# Patient Record
Sex: FEMALE | Race: WHITE | NOT HISPANIC OR LATINO
[De-identification: names, ages, dates, MRNs, and addresses within clinical notes are randomized per-mention and may not be internally consistent; named-entity substitution may affect disease eponyms.]

---

## 2022-10-16 ENCOUNTER — NON-APPOINTMENT (OUTPATIENT)
Age: 62
End: 2022-10-16

## 2022-10-20 ENCOUNTER — APPOINTMENT (OUTPATIENT)
Dept: PULMONOLOGY | Facility: CLINIC | Age: 62
End: 2022-10-20

## 2022-10-20 DIAGNOSIS — Z00.00 ENCOUNTER FOR GENERAL ADULT MEDICAL EXAMINATION W/OUT ABNORMAL FINDINGS: ICD-10-CM

## 2022-10-20 PROCEDURE — 94010 BREATHING CAPACITY TEST: CPT

## 2022-10-20 PROCEDURE — 94726 PLETHYSMOGRAPHY LUNG VOLUMES: CPT

## 2022-10-20 PROCEDURE — 94729 DIFFUSING CAPACITY: CPT

## 2022-11-02 ENCOUNTER — NON-APPOINTMENT (OUTPATIENT)
Age: 62
End: 2022-11-02

## 2022-11-03 ENCOUNTER — APPOINTMENT (OUTPATIENT)
Dept: PULMONOLOGY | Facility: CLINIC | Age: 62
End: 2022-11-03

## 2022-11-03 ENCOUNTER — NON-APPOINTMENT (OUTPATIENT)
Age: 62
End: 2022-11-03

## 2022-11-03 VITALS
HEART RATE: 72 BPM | DIASTOLIC BLOOD PRESSURE: 82 MMHG | SYSTOLIC BLOOD PRESSURE: 162 MMHG | RESPIRATION RATE: 14 BRPM | BODY MASS INDEX: 20.95 KG/M2 | OXYGEN SATURATION: 99 % | TEMPERATURE: 97.5 F | WEIGHT: 118.25 LBS | HEIGHT: 63 IN

## 2022-11-03 LAB
ALBUMIN SERPL ELPH-MCNC: 4.5 G/DL
ALP BLD-CCNC: 55 U/L
ALT SERPL-CCNC: 10 U/L
ANION GAP SERPL CALC-SCNC: 11 MMOL/L
AST SERPL-CCNC: 19 U/L
BASOPHILS # BLD AUTO: 0 K/UL
BASOPHILS NFR BLD AUTO: 0 %
BILIRUB SERPL-MCNC: 0.4 MG/DL
BUN SERPL-MCNC: 18 MG/DL
CALCIUM SERPL-MCNC: 9.4 MG/DL
CHLORIDE SERPL-SCNC: 101 MMOL/L
CO2 SERPL-SCNC: 25 MMOL/L
CREAT SERPL-MCNC: 0.64 MG/DL
EGFR: 99 ML/MIN/1.73M2
EOSINOPHIL # BLD AUTO: 0.08 K/UL
EOSINOPHIL NFR BLD AUTO: 1.2 %
GLUCOSE SERPL-MCNC: 97 MG/DL
HCT VFR BLD CALC: 41.1 %
HGB BLD-MCNC: 13.7 G/DL
IMM GRANULOCYTES NFR BLD AUTO: 0.3 %
LYMPHOCYTES # BLD AUTO: 0.99 K/UL
LYMPHOCYTES NFR BLD AUTO: 14.6 %
MAN DIFF?: NORMAL
MCHC RBC-ENTMCNC: 33.2 PG
MCHC RBC-ENTMCNC: 33.3 GM/DL
MCV RBC AUTO: 99.5 FL
MONOCYTES # BLD AUTO: 0.69 K/UL
MONOCYTES NFR BLD AUTO: 10.1 %
NEUTROPHILS # BLD AUTO: 5.02 K/UL
NEUTROPHILS NFR BLD AUTO: 73.8 %
NT-PROBNP SERPL-MCNC: 28 PG/ML
PLATELET # BLD AUTO: 227 K/UL
POTASSIUM SERPL-SCNC: 4.4 MMOL/L
PROT SERPL-MCNC: 7.6 G/DL
RBC # BLD: 4.13 M/UL
RBC # FLD: 13.2 %
SODIUM SERPL-SCNC: 137 MMOL/L
WBC # FLD AUTO: 6.8 K/UL

## 2022-11-03 PROCEDURE — 94618 PULMONARY STRESS TESTING: CPT

## 2022-11-03 PROCEDURE — ZZZZZ: CPT

## 2022-11-03 PROCEDURE — 99205 OFFICE O/P NEW HI 60 MIN: CPT | Mod: 25

## 2022-11-03 PROCEDURE — 36415 COLL VENOUS BLD VENIPUNCTURE: CPT

## 2022-11-03 NOTE — DISCUSSION/SUMMARY
[FreeTextEntry1] : ---Assessment plan----------The patient has been referred here for further opinion regarding pulmonary problem,\par \par 63 yo F Never smoker w/ PMH CST on xarelto (dx ~2019), APLS, osteoporosis on prolia, lupus, and scleroderma [   SCL 70 positive   ]   treated with plaquenil for over 15 years. 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 twice daily\par 2 APL-----on anticoagulation-Xarelto\par 3 needs 6-minute walk test, repeat CT chest\par 8-es-dq-date on all vaccinations\par 5 osteoporosis----on Prolia\par 6 labs today\par \par Follow-up in 6 weeks\par \par \par \par \par Thanks for allowing  me to participate  in the care of this patient.  Patient at this time  will follow  the above mentioned recommendations and return back for follow up visit. If you have any questions  I can be reached  at # [phone] (office).\par \par Jose Shaikh MD, FCCP \par Director, Pulmonary Hypertension Program \par [facility] \par Division of Pulmonary, Critical Care and Sleep Medicine \par  Professor of Medicine \par [employer]\par \par OLAF Gary-C\par

## 2022-11-03 NOTE — PHYSICAL EXAM
[No Acute Distress] : no acute distress [Normal Oropharynx] : normal oropharynx [III] : Mallampati Class: III [Normal S1, S2] : normal s1, s2 [No Abnormalities] : no abnormalities [Benign] : benign [Normal Gait] : normal gait [Non-Pitting] : non-pitting [No Focal Deficits] : no focal deficits [Oriented x3] : oriented x3 [TextBox_68] : Bilateral basal crackles [TextBox_105] : Sclerodactyly

## 2022-11-03 NOTE — REVIEW OF SYSTEMS
[Postnasal Drip] : postnasal drip [Cough] : cough [Dyspnea] : dyspnea [SOB on Exertion] : sob on exertion [GERD] : gerd [Arthralgias] : arthralgias [Raynaud] : raynaud [Fever] : no fever [Chills] : no chills [Nasal Congestion] : no nasal congestion [Chest Discomfort] : no chest discomfort [Rash] : no rash [Headache] : no headache [Numbness] : no numbness [Diabetes] : no diabetes [Obesity] : no obesity

## 2022-11-03 NOTE — HISTORY OF PRESENT ILLNESS
[TextBox_4] : This letter  is regarding your patient  who  attended pulmonary out patient office today.  I have reviewed  patient's  past history, social history, family history and medication list. I also  reviewed nurse practitioners/ and fellows  notes and assessment and agree with it.  \par The patient was referred by  [rheumatology]-----\par \par \par 63 yo F Never smoker w/ PMH CST on xarelto (dx ~2019), APLS, osteoporosis on prolia, lupus, and scleroderma treated with plaquenil for over 15 years. Reports being asymptomatic for over a decade. Presents today after referral from rheumatology. Reports in January 2021 began experiencing slow progressive systemic symptoms with swelling of digits, legs, and exertional shortness of breath. Underwent cardiac evaluation including echo and exercise stress test and reported normal. Follow up CT coronary March 28, 2022 with evidence of ground glass opacities. Treated with bronchodilators and 4 week taper of steroids with improvement in symptoms. Had joint flare in August treated supportively. Had endoscopy recently and reportedly normal\par \par You have a CAT scan report on this patient------No history of , fever, chills , rigors, chest pain, or hemoptysis. Questionable history of Raynaud's phenomenon. No h/o significant weight loss in last few months. No history of liver dysfunction , collagen vascular disorder or chronic thromboembolic disease. I would classify the patient's dyspnea as WHO  FUNCTIONAL CLASS I--------\par \par \par \par ----Echo  date------- details not available but was told it is normal--------\par ----Pft date----10/2022 normal lung volumes, decreased dlco-----\par ----Ct scan date------- outside film-----bibasilar groundglass appearance\par \par \par

## 2022-11-04 LAB
CRP SERPL-MCNC: <3 MG/L
ERYTHROCYTE [SEDIMENTATION RATE] IN BLOOD BY WESTERGREN METHOD: 14 MM/HR
FERRITIN SERPL-MCNC: 25 NG/ML

## 2022-11-14 ENCOUNTER — NON-APPOINTMENT (OUTPATIENT)
Age: 62
End: 2022-11-14

## 2022-11-14 ENCOUNTER — APPOINTMENT (OUTPATIENT)
Dept: CARDIOLOGY | Facility: CLINIC | Age: 62
End: 2022-11-14

## 2022-11-14 VITALS
SYSTOLIC BLOOD PRESSURE: 147 MMHG | OXYGEN SATURATION: 99 % | DIASTOLIC BLOOD PRESSURE: 82 MMHG | BODY MASS INDEX: 21.09 KG/M2 | HEART RATE: 72 BPM | WEIGHT: 119 LBS | HEIGHT: 63 IN

## 2022-11-14 DIAGNOSIS — Z85.828 PERSONAL HISTORY OF OTHER MALIGNANT NEOPLASM OF SKIN: ICD-10-CM

## 2022-11-14 DIAGNOSIS — R06.02 SHORTNESS OF BREATH: ICD-10-CM

## 2022-11-14 DIAGNOSIS — Z82.49 FAMILY HISTORY OF ISCHEMIC HEART DISEASE AND OTHER DISEASES OF THE CIRCULATORY SYSTEM: ICD-10-CM

## 2022-11-14 DIAGNOSIS — R60.9 EDEMA, UNSPECIFIED: ICD-10-CM

## 2022-11-14 DIAGNOSIS — Z87.81 PERSONAL HISTORY OF (HEALED) TRAUMATIC FRACTURE: ICD-10-CM

## 2022-11-14 DIAGNOSIS — Z87.898 PERSONAL HISTORY OF OTHER SPECIFIED CONDITIONS: ICD-10-CM

## 2022-11-14 DIAGNOSIS — M25.50 PAIN IN UNSPECIFIED JOINT: ICD-10-CM

## 2022-11-14 PROCEDURE — 99204 OFFICE O/P NEW MOD 45 MIN: CPT | Mod: 25

## 2022-11-14 PROCEDURE — 93000 ELECTROCARDIOGRAM COMPLETE: CPT

## 2022-11-14 RX ORDER — MULTIVITAMIN
TABLET ORAL
Refills: 0 | Status: ACTIVE | COMMUNITY

## 2022-11-14 RX ORDER — PSYLLIUM HUSK 0.4 G
CAPSULE ORAL
Refills: 0 | Status: ACTIVE | COMMUNITY

## 2022-11-14 RX ORDER — HYDROXYCHLOROQUINE SULFATE 200 MG/1
200 TABLET ORAL DAILY
Refills: 0 | Status: ACTIVE | COMMUNITY

## 2022-11-14 RX ORDER — OMEPRAZOLE 20 MG/1
20 CAPSULE, DELAYED RELEASE ORAL
Qty: 90 | Refills: 0 | Status: ACTIVE | COMMUNITY
Start: 2022-09-09

## 2022-11-14 RX ORDER — AMLODIPINE BESYLATE 5 MG/1
5 TABLET ORAL
Qty: 60 | Refills: 0 | Status: ACTIVE | COMMUNITY
Start: 2022-09-09

## 2022-11-14 RX ORDER — UBIDECARENONE/VIT E ACET 100MG-5
CAPSULE ORAL
Refills: 0 | Status: ACTIVE | COMMUNITY

## 2022-11-14 RX ORDER — OMEPRAZOLE MAGNESIUM 20 MG/1
20 CAPSULE, DELAYED RELEASE ORAL
Refills: 0 | Status: ACTIVE | COMMUNITY

## 2022-11-14 RX ORDER — RIVAROXABAN 20 MG/1
20 TABLET, FILM COATED ORAL
Qty: 1 | Refills: 6 | Status: ACTIVE | COMMUNITY

## 2022-11-14 RX ORDER — RIVAROXABAN 20 MG/1
20 TABLET, FILM COATED ORAL
Qty: 90 | Refills: 0 | Status: ACTIVE | COMMUNITY
Start: 2022-05-16

## 2022-11-14 RX ORDER — AMLODIPINE BESYLATE 2.5 MG/1
2.5 TABLET ORAL
Refills: 0 | Status: DISCONTINUED | COMMUNITY
End: 2022-11-14

## 2022-11-14 RX ORDER — HYDROXYCHLOROQUINE SULFATE 200 MG/1
200 TABLET, FILM COATED ORAL
Qty: 90 | Refills: 0 | Status: ACTIVE | COMMUNITY
Start: 2022-09-09

## 2022-11-14 NOTE — HISTORY OF PRESENT ILLNESS
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 \par \par She carries a past medical history of hypertension, hyperlipidemia, interstitial lung disease,  antiphospholipid syndrome, CVST ( on Xarelto), lupus, scleroderma with Plaquenil for over 15 years.\par \par Patient is followed by pulmonologist, Dr. Lizett Shaikh for her ILD.\par Most recently patient was exercising on her [place] and developed an irregular heart rate. She continued to work out and the irregular rhythm resolved.\par \par She reports that she had a cardiac workup at [facility] and was told that everything was "ok".\par Patient now wants to establish care with [facility] cardiology. \par \par Blood pressure today:  147 /82\par EKG- Sinus rhythm @ 72 bpm\par \par Patient is maintained on blood pressure medication:  Amlodipine increased from 2.5 mg to 5 mg. \par \par Denies any issues with chest discomfort.

## 2022-11-14 NOTE — DISCUSSION/SUMMARY
[EKG obtained to assist in diagnosis and management of assessed problem(s)] : EKG obtained to assist in diagnosis and management of assessed problem(s) [FreeTextEntry1] : Ms. Martinez is a 62 year old female with  medical history of hypertension, hyperlipidemia, interstitial lung disease,  antiphospholipid syndrome, CVST ( on Xarelto), lupus, scleroderma with Plaquenil for over 15 years.\par \par Most recently patient was exercising on her [place] and developed an irregular heart rate. She continued to work out and the irregular rhythm resolved.\par \par #Symptomatic Palpitations\par   Recommending exercise stress test to evaluate for inducible exercise arrhythmia\par \par #Hypertension\par   Blood pressure demonstrates improved control, but suboptimal\par   Continue on Amlodipine 5mg QD \par   Requested a one week BP og to evaluate BP pressures at home\par \par #APLS\par   Treated with Xarelto 20 mg QD\par \par #Lupus/Scleroderma\par   Treated by Rheumatologist, Dr. Avni Jewell\par   Continuing on Plaquenil 200 mg QD\par   Continuing Mycophenolate Mofetil 500 mg BID\par \par - Encouraged patient to continue healthy exercise and eating habits, focusing on a Mediterranean style of eating and aiming for the recommended 150 minutes per week of moderate physical activity.\par \par - Encouraged the patient to find healthy outlets and coping mechanisms to help manage stress, such as physical activity/exercise, reducing workload if possible, spending time with family and friends, engaging in an enjoyable hobby, or using meditation or mindfulness techniques.\par \par \par \par

## 2022-11-23 LAB
EJ AB SER QL: NEGATIVE
ENA JO1 AB SER IA-ACNC: <20 UNITS
ENA PM/SCL AB SER-ACNC: <20 UNITS
ENA SM+RNP AB SER IA-ACNC: <20 UNITS
ENA SS-A IGG SER QL: <20 UNITS
FIBRILLARIN AB SER QL: NEGATIVE
KU AB SER QL: ABNORMAL
MDA-5 (P140)(CADM-140): <20 UNITS
MI2 AB SER QL: ABNORMAL
NXP-2 (P140): <20 UNITS
OJ AB SER QL: NEGATIVE
PL12 AB SER QL: NEGATIVE
PL7 AB SER QL: NEGATIVE
SRP AB SERPL QL: NEGATIVE
TIF GAMMA (P155/140): <20 UNITS
U2 SNRNP AB SER QL: NEGATIVE

## 2022-12-14 ENCOUNTER — NON-APPOINTMENT (OUTPATIENT)
Age: 62
End: 2022-12-14

## 2022-12-15 ENCOUNTER — APPOINTMENT (OUTPATIENT)
Dept: PULMONOLOGY | Facility: CLINIC | Age: 62
End: 2022-12-15

## 2022-12-15 VITALS
SYSTOLIC BLOOD PRESSURE: 142 MMHG | RESPIRATION RATE: 15 BRPM | TEMPERATURE: 98 F | WEIGHT: 118.13 LBS | BODY MASS INDEX: 20.93 KG/M2 | HEART RATE: 96 BPM | HEIGHT: 63 IN | OXYGEN SATURATION: 98 % | DIASTOLIC BLOOD PRESSURE: 86 MMHG

## 2022-12-15 DIAGNOSIS — D68.61 ANTIPHOSPHOLIPID SYNDROME: ICD-10-CM

## 2022-12-15 PROCEDURE — 36415 COLL VENOUS BLD VENIPUNCTURE: CPT

## 2022-12-15 PROCEDURE — 99215 OFFICE O/P EST HI 40 MIN: CPT | Mod: 25

## 2022-12-15 RX ORDER — AZELASTINE HYDROCHLORIDE 137 UG/1
137 SPRAY, METERED NASAL TWICE DAILY
Qty: 1 | Refills: 1 | Status: ACTIVE | COMMUNITY
Start: 2022-12-15 | End: 1900-01-01

## 2022-12-15 RX ORDER — FLUTICASONE PROPIONATE 50 UG/1
50 SPRAY, METERED NASAL DAILY
Qty: 1 | Refills: 1 | Status: ACTIVE | COMMUNITY
Start: 2022-12-15 | End: 1900-01-01

## 2022-12-15 NOTE — HISTORY OF PRESENT ILLNESS
[TextBox_4] : This letter  is regarding your patient  who  attended pulmonary out patient office today.  I have reviewed  patient's  past history, social history, family history and medication list. I also  reviewed nurse practitioners/ and fellows  notes and assessment and agree with it.  \par The patient was referred by  [rheumatology]-----\par \par \par 61 yo F Never smoker w/ PMH CST on xarelto (dx ~2019), APLS, osteoporosis on prolia, lupus, and scleroderma treated with plaquenil for over 15 years. Reports being asymptomatic for over a decade. Presents today after referral from rheumatology. Reports in January 2021 began experiencing slow progressive systemic symptoms with swelling of digits, legs, and exertional shortness of breath. Underwent cardiac evaluation including echo and exercise stress test and reported normal. Follow up CT coronary March 28, 2022 with evidence of ground glass opacities. Treated with bronchodilators and 4 week taper of steroids with improvement in symptoms. Had joint flare in August treated supportively. Had endoscopy recently and reportedly normal\par \par You have a CAT scan report on this patient------No history of , fever, chills , rigors, chest pain, or hemoptysis. Questionable history of Raynaud's phenomenon. No h/o significant weight loss in last few months. No history of liver dysfunction , collagen vascular disorder or chronic thromboembolic disease. I would classify the patient's dyspnea as WHO  FUNCTIONAL CLASS I--------\par \par \par \par ----Echo  date------- details not available but was told it is normal--------\par ----Pft date----10/2022 normal lung volumes, decreased dlco-----\par ----Ct scan date------- outside film-----bibasilar groundglass appearance\par \par \par 12/2022 ILD/SLE/scleroderma/  ??DERMATOMYOSITIS [ POSITIVE  M-12 ANTIBODIES ] - on cellcept 500mg bid and plaquenil per Dr Jewell rheum\par No pulm complaints- cycles 3x weekly- did have some palpitations w/exercise- saw Dr Gunter in cardiology who ordered a stress test (sched for jan 2023)\par is c/o post nasal drip\par utd w/influenza vac and covid vac x 2

## 2022-12-15 NOTE — DISCUSSION/SUMMARY
[FreeTextEntry1] : ---Assessment plan----------The patient has been referred here for further opinion regarding pulmonary problem,\par \par 61 yo F Never smoker w/ PMH CST on xarelto (dx ~2019), APLS, osteoporosis on prolia, lupus, and scleroderma [   SCL 70 positive   ]   treated with plaquenil for over 15 years. ----Has developed interstitial lung disease.\par \par 1  ILD --SLE/scleroderma/  ??DERMATOMYOSITIS [ POSITIVE  M-12 ANTIBODIES ]  -- increase CellCept 750 mg p.o. twice daily and plaquenil- follows Dr Jewell- rheum---WILL ALSO OBTAIN CARDIOPULMONARY STRESS TEST - - - - \par 2 APL-----on anticoagulation-Xarelto\par 3 palpitations- f/u with Dr Gunter o/s stress test\par 1-tx-vg-date on all vaccinations\par 5 osteoporosis----on Prolia\par 7- flonase and azelastine for post nasal  drip\par 8- labs drawn in our office today/ s/p resp swab\par 9-f/u feb 2023\par \par \par Thanks for allowing  me to participate  in the care of this patient.  Patient at this time  will follow  the above mentioned recommendations and return back for follow up visit. If you have any questions  I can be reached  at # [phone] (office).\par \par Jose Shaikh MD, FCCP \par Director, Pulmonary Hypertension Program \par [facility] \par Division of Pulmonary, Critical Care and Sleep Medicine \par  Professor of Medicine \par [employer] [facility] [employer]\par \par OLAF Gary-C\par

## 2022-12-16 LAB
25(OH)D3 SERPL-MCNC: 65.4 NG/ML
ALBUMIN SERPL ELPH-MCNC: 4.7 G/DL
ALP BLD-CCNC: 52 U/L
ALT SERPL-CCNC: 9 U/L
ANION GAP SERPL CALC-SCNC: 12 MMOL/L
AST SERPL-CCNC: 18 U/L
BASOPHILS # BLD AUTO: 0.01 K/UL
BASOPHILS NFR BLD AUTO: 0.1 %
BILIRUB SERPL-MCNC: 0.4 MG/DL
BUN SERPL-MCNC: 15 MG/DL
CALCIUM SERPL-MCNC: 10.3 MG/DL
CHLORIDE SERPL-SCNC: 101 MMOL/L
CHOLEST SERPL-MCNC: 234 MG/DL
CO2 SERPL-SCNC: 26 MMOL/L
CREAT SERPL-MCNC: 0.65 MG/DL
DEPRECATED KAPPA LC FREE/LAMBDA SER: 0.92 RATIO
EGFR: 98 ML/MIN/1.73M2
EOSINOPHIL # BLD AUTO: 0.07 K/UL
EOSINOPHIL NFR BLD AUTO: 1 %
ESTIMATED AVERAGE GLUCOSE: 108 MG/DL
FERRITIN SERPL-MCNC: 37 NG/ML
GLUCOSE SERPL-MCNC: 102 MG/DL
HBA1C MFR BLD HPLC: 5.4 %
HCT VFR BLD CALC: 43.8 %
HDLC SERPL-MCNC: 87 MG/DL
HGB BLD-MCNC: 14.8 G/DL
IGA SER QL IEP: 160 MG/DL
IGG SER QL IEP: 1824 MG/DL
IGM SER QL IEP: 247 MG/DL
IMM GRANULOCYTES NFR BLD AUTO: 0.3 %
KAPPA LC CSF-MCNC: 2.49 MG/DL
KAPPA LC SERPL-MCNC: 2.3 MG/DL
LDLC SERPL CALC-MCNC: 134 MG/DL
LYMPHOCYTES # BLD AUTO: 1.15 K/UL
LYMPHOCYTES NFR BLD AUTO: 15.8 %
MAN DIFF?: NORMAL
MCHC RBC-ENTMCNC: 33.8 GM/DL
MCHC RBC-ENTMCNC: 34.3 PG
MCV RBC AUTO: 101.6 FL
MONOCYTES # BLD AUTO: 0.7 K/UL
MONOCYTES NFR BLD AUTO: 9.6 %
NEUTROPHILS # BLD AUTO: 5.34 K/UL
NEUTROPHILS NFR BLD AUTO: 73.2 %
NONHDLC SERPL-MCNC: 147 MG/DL
PLATELET # BLD AUTO: 281 K/UL
POTASSIUM SERPL-SCNC: 4.8 MMOL/L
PROT SERPL-MCNC: 8 G/DL
RAPID RVP RESULT: NOT DETECTED
RBC # BLD: 4.31 M/UL
RBC # FLD: 13.3 %
SARS-COV-2 RNA PNL RESP NAA+PROBE: NOT DETECTED
SODIUM SERPL-SCNC: 140 MMOL/L
TOTAL IGE SMQN RAST: 21 KU/L
TRIGL SERPL-MCNC: 66 MG/DL
TSH SERPL-ACNC: 2.45 UIU/ML
VIT B12 SERPL-MCNC: 421 PG/ML
WBC # FLD AUTO: 7.29 K/UL

## 2022-12-16 RX ORDER — AZITHROMYCIN 250 MG/1
250 TABLET, FILM COATED ORAL
Qty: 1 | Refills: 0 | Status: ACTIVE | COMMUNITY
Start: 2022-12-16 | End: 1900-01-01

## 2022-12-18 ENCOUNTER — NON-APPOINTMENT (OUTPATIENT)
Age: 62
End: 2022-12-18

## 2022-12-19 ENCOUNTER — APPOINTMENT (OUTPATIENT)
Dept: PULMONOLOGY | Facility: CLINIC | Age: 62
End: 2022-12-19
Payer: COMMERCIAL

## 2022-12-19 ENCOUNTER — APPOINTMENT (OUTPATIENT)
Dept: PULMONOLOGY | Facility: CLINIC | Age: 62
End: 2022-12-19

## 2022-12-19 DIAGNOSIS — R09.82 POSTNASAL DRIP: ICD-10-CM

## 2022-12-19 DIAGNOSIS — U07.1 COVID-19: ICD-10-CM

## 2022-12-19 LAB
M TB IFN-G BLD-IMP: NEGATIVE
QUANTIFERON TB PLUS MITOGEN MINUS NIL: 0.68 IU/ML
QUANTIFERON TB PLUS NIL: 0.01 IU/ML
QUANTIFERON TB PLUS TB1 MINUS NIL: 0 IU/ML
QUANTIFERON TB PLUS TB2 MINUS NIL: 0.01 IU/ML

## 2022-12-19 PROCEDURE — 99214 OFFICE O/P EST MOD 30 MIN: CPT | Mod: 95

## 2022-12-19 NOTE — DISCUSSION/SUMMARY
[FreeTextEntry1] : ---Assessment plan----------The patient has been referred here for further opinion regarding pulmonary problem,\par \par 61 yo F Never smoker w/ PMH CST on xarelto (dx ~2019), APLS, osteoporosis on prolia, lupus, and scleroderma [   SCL 70 positive   ]   treated with plaquenil for over 15 years. ----Has developed interstitial lung disease.\par \par \par COVID POSITIVE \par 1  ILD --SLE/scleroderma/  ??DERMATOMYOSITIS [ POSITIVE  M-12 ANTIBODIES ]  cellcept to stay at 500mg bid this week until recovered from covid ---then 750mg bid\par 2) already on zpack and nasal sprays -  flonase and azelastine\par 3)  APL-----on anticoagulation-Xarelto\par 4) maintain hydration\par 5) f/u this week if needed\par \par \par \par Thanks for allowing  me to participate  in the care of this patient.  Patient at this time  will follow  the above mentioned recommendations and return back for follow up visit. If you have any questions  I can be reached  at # [phone] (office).\par \par Jose Shaikh MD, FCCP \par Director, Pulmonary Hypertension Program \par [facility] \par Division of Pulmonary, Critical Care and Sleep Medicine \par  Professor of Medicine \par [employer]\par \par RAYA Gary\par

## 2022-12-19 NOTE — HISTORY OF PRESENT ILLNESS
[Home] : at home, [unfilled] , at the time of the visit. [Medical Office: ([facility])___] : at the medical office located in  [Verbal consent obtained from patient] : the patient, [unfilled] [TextBox_4] : This letter  is regarding your patient  who  attended pulmonary out patient office today.  I have reviewed  patient's  past history, social history, family history and medication list. I also  reviewed nurse practitioners/ and fellows  notes and assessment and agree with it.  \par The patient was referred by  [rheumatology]-----\par \par \par 63 yo F Never smoker w/ PMH CST on xarelto (dx ~2019), APLS, osteoporosis on prolia, lupus, and scleroderma treated with plaquenil for over 15 years. Reports being asymptomatic for over a decade. Presents today after referral from rheumatology. Reports in January 2021 began experiencing slow progressive systemic symptoms with swelling of digits, legs, and exertional shortness of breath. Underwent cardiac evaluation including echo and exercise stress test and reported normal. Follow up CT coronary March 28, 2022 with evidence of ground glass opacities. Treated with bronchodilators and 4 week taper of steroids with improvement in symptoms. Had joint flare in August treated supportively. Had endoscopy recently and reportedly normal\par \par You have a CAT scan report on this patient------No history of , fever, chills , rigors, chest pain, or hemoptysis. Questionable history of Raynaud's phenomenon. No h/o significant weight loss in last few months. No history of liver dysfunction , collagen vascular disorder or chronic thromboembolic disease. I would classify the patient's dyspnea as WHO  FUNCTIONAL CLASS I--------\par \par \par \par \par ----Echo  date------- details not available but was told it is normal--------\par ----Pft date----10/2022 normal lung volumes, decreased dlco-----\par ----Ct scan date------- outside film-----bibasilar groundglass appearance\par \par \par 12/2022 ILD/SLE/scleroderma/  ??DERMATOMYOSITIS [ POSITIVE  M-12 ANTIBODIES ] - on cellcept 500mg bid and plaquenil per Dr Jewell rheum\par No pulm complaints- cycles 3x weekly- did have some palpitations w/exercise- saw Dr Gunter in cardiology who ordered a stress test (sched for jan 2023)\par is c/o post nasal drip\par utd w/influenza vac and covid vac x 2\par \par \par 12/19/22 teledoc visit- no apparent distress\par tested positive for covid yesterday\par minimal symptoms- afebrile- already on antibiotic (zpack) and nasal spray

## 2022-12-19 NOTE — REVIEW OF SYSTEMS
[Fever] : no fever [Chills] : no chills [Nasal Congestion] : no nasal congestion [Postnasal Drip] : postnasal drip [Cough] : cough [Dyspnea] : dyspnea [SOB on Exertion] : sob on exertion [Chest Discomfort] : no chest discomfort [GERD] : gerd [Arthralgias] : arthralgias [Raynaud] : raynaud [Rash] : no rash [Headache] : no headache [Numbness] : no numbness [Diabetes] : no diabetes [Obesity] : no obesity

## 2022-12-20 ENCOUNTER — NON-APPOINTMENT (OUTPATIENT)
Age: 62
End: 2022-12-20

## 2022-12-23 ENCOUNTER — NON-APPOINTMENT (OUTPATIENT)
Age: 62
End: 2022-12-23

## 2023-01-03 ENCOUNTER — OUTPATIENT (OUTPATIENT)
Dept: OUTPATIENT SERVICES | Facility: HOSPITAL | Age: 63
LOS: 1 days | End: 2023-01-03
Payer: MEDICARE

## 2023-01-03 ENCOUNTER — APPOINTMENT (OUTPATIENT)
Dept: CV DIAGNOSTICS | Facility: HOSPITAL | Age: 63
End: 2023-01-03

## 2023-01-03 DIAGNOSIS — Z87.898 PERSONAL HISTORY OF OTHER SPECIFIED CONDITIONS: ICD-10-CM

## 2023-01-03 PROCEDURE — 93018 CV STRESS TEST I&R ONLY: CPT

## 2023-01-03 PROCEDURE — 93017 CV STRESS TEST TRACING ONLY: CPT

## 2023-01-03 PROCEDURE — 93016 CV STRESS TEST SUPVJ ONLY: CPT

## 2023-01-31 LAB
ALBUMIN SERPL ELPH-MCNC: 4.4 G/DL
ALP BLD-CCNC: 43 U/L
ALT SERPL-CCNC: 10 U/L
ANION GAP SERPL CALC-SCNC: 11 MMOL/L
AST SERPL-CCNC: 18 U/L
BASOPHILS # BLD AUTO: 0 K/UL
BASOPHILS NFR BLD AUTO: 0 %
BILIRUB SERPL-MCNC: 0.6 MG/DL
BUN SERPL-MCNC: 16 MG/DL
CALCIUM SERPL-MCNC: 9.7 MG/DL
CHLORIDE SERPL-SCNC: 102 MMOL/L
CO2 SERPL-SCNC: 27 MMOL/L
CREAT SERPL-MCNC: 0.73 MG/DL
EGFR: 91 ML/MIN/1.73M2
EOSINOPHIL # BLD AUTO: 0.06 K/UL
EOSINOPHIL NFR BLD AUTO: 1 %
GLUCOSE SERPL-MCNC: 105 MG/DL
HCT VFR BLD CALC: 41 %
HGB BLD-MCNC: 13.7 G/DL
IMM GRANULOCYTES NFR BLD AUTO: 0.2 %
LYMPHOCYTES # BLD AUTO: 1.05 K/UL
LYMPHOCYTES NFR BLD AUTO: 17.8 %
MAN DIFF?: NORMAL
MCHC RBC-ENTMCNC: 33.3 PG
MCHC RBC-ENTMCNC: 33.4 GM/DL
MCV RBC AUTO: 99.5 FL
MONOCYTES # BLD AUTO: 0.66 K/UL
MONOCYTES NFR BLD AUTO: 11.2 %
NEUTROPHILS # BLD AUTO: 4.12 K/UL
NEUTROPHILS NFR BLD AUTO: 69.8 %
PLATELET # BLD AUTO: 248 K/UL
POTASSIUM SERPL-SCNC: 4.8 MMOL/L
PROT SERPL-MCNC: 6.9 G/DL
RBC # BLD: 4.12 M/UL
RBC # FLD: 13.3 %
SODIUM SERPL-SCNC: 140 MMOL/L
WBC # FLD AUTO: 5.9 K/UL

## 2023-02-02 ENCOUNTER — NON-APPOINTMENT (OUTPATIENT)
Age: 63
End: 2023-02-02

## 2023-02-03 ENCOUNTER — NON-APPOINTMENT (OUTPATIENT)
Age: 63
End: 2023-02-03

## 2023-03-27 ENCOUNTER — APPOINTMENT (OUTPATIENT)
Dept: PULMONOLOGY | Facility: CLINIC | Age: 63
End: 2023-03-27
Payer: MEDICARE

## 2023-03-27 ENCOUNTER — RESULT CHARGE (OUTPATIENT)
Age: 63
End: 2023-03-27

## 2023-03-27 DIAGNOSIS — Z20.822 CONTACT WITH AND (SUSPECTED) EXPOSURE TO COVID-19: ICD-10-CM

## 2023-03-27 LAB — SARS-COV-2 AG RESP QL IA.RAPID: NEGATIVE

## 2023-03-27 PROCEDURE — 94621 CARDIOPULM EXERCISE TESTING: CPT

## 2023-03-27 PROCEDURE — 99215 OFFICE O/P EST HI 40 MIN: CPT | Mod: 25

## 2023-03-27 PROCEDURE — 87811 SARS-COV-2 COVID19 W/OPTIC: CPT

## 2023-03-27 PROCEDURE — ZZZZZ: CPT

## 2023-03-27 PROCEDURE — 94375 RESPIRATORY FLOW VOLUME LOOP: CPT

## 2023-03-27 NOTE — DISCUSSION/SUMMARY
[FreeTextEntry1] : ---Assessment plan----------The patient has been referred here for further opinion regarding pulmonary problem,\par \par 64 yo F Never smoker w/ PMH CST on xarelto (dx ~2019), APLS, osteoporosis on prolia, lupus, and scleroderma [   SCL 70 positive   ]   treated with plaquenil for over 15 years. --- developed interstitial lung disease  POST COVID 2022----\par \par \par COVID POSITIVE \par 1  ILD --SLE/scleroderma/  ??DERMATOMYOSITIS [ POSITIVE  M-12 ANTIBODIES ]  cellcept to stay at 500mg bid this week until recovered from covid ---then 750mg bid ---ASLO ON PLAQUENI L - - - -\par 2) OSTOPOROSIS--ON PROLIA---\par 3)  APL-----on anticoagulation-Xarelto\par 4) NEEDS ECHO  \par 5 CPET  RESULTS\par \par \par \par Thanks for allowing  me to participate  in the care of this patient.  Patient at this time  will follow  the above mentioned recommendations and return back for follow up visit. If you have any questions  I can be reached  at # [phone] (office).\par \par Jose Shaikh MD, FCCP \par Director, Pulmonary Hypertension Program \par [facility] \par Division of Pulmonary, Critical Care and Sleep Medicine \par  Professor of Medicine \par [employer]\par \par NICK GaryC\par

## 2023-03-27 NOTE — HISTORY OF PRESENT ILLNESS
[Never] : never [TextBox_4] : This letter  is regarding your patient  who  attended pulmonary out patient office today.  I have reviewed  patient's  past history, social history, family history and medication list. I also  reviewed nurse practitioners/ and fellows  notes and assessment and agree with it.  \par The patient was referred by  [rheumatology]-----\par \par \par 62 yo F Never smoker w/ PMH CST on xarelto (dx ~2019), APLS, osteoporosis on prolia, lupus, and scleroderma treated with plaquenil for over 15 years. Reports being asymptomatic for over a decade. Presents today after referral from rheumatology. Reports in January 2021 began experiencing slow progressive systemic symptoms with swelling of digits, legs, and exertional shortness of breath. Underwent cardiac evaluation including echo and exercise stress test and reported normal. Follow up CT coronary March 28, 2022 with evidence of ground glass opacities. Treated with bronchodilators and 4 week taper of steroids with improvement in symptoms. Had joint flare in August treated supportively. Had endoscopy recently and reportedly normal\par \par You have a CAT scan report on this patient------No history of , fever, chills , rigors, chest pain, or hemoptysis. Questionable history of Raynaud's phenomenon. No h/o significant weight loss in last few months. No history of liver dysfunction , collagen vascular disorder or chronic thromboembolic disease. I would classify the patient's dyspnea as WHO  FUNCTIONAL CLASS I--------\par \par \par \par \par ----Echo  date------- details not available but was told it is normal--------\par ----Pft date----10/2022 normal lung volumes, decreased dlco-----\par ----Ct scan date------- outside film-----bibasilar groundglass appearance\par \par \par 12/2022 ILD/SLE/scleroderma/  ??DERMATOMYOSITIS [ POSITIVE  M-12 ANTIBODIES ] - on cellcept 500mg bid and plaquenil per Dr Jewell rheum\par No pulm complaints- cycles 3x weekly- did have some palpitations w/exercise- saw Dr Gunter in cardiology who ordered a stress test (sched for jan 2023)\par is c/o post nasal drip\par utd w/influenza vac and covid vac x 2\par \par \par 12/19/22 teledoc visit- no apparent distress\par tested positive for covid yesterday\par minimal symptoms- afebrile- already on antibiotic (zpack) and nasal spray\par \par march 2023------------  ILD/SLE/scleroderma/  ??DERMATOMYOSITIS [ POSITIVE  M-12 ANTIBODIES ] - on cellcept 500mg bid and plaquenil per Dr Jewell rheum ----NEEDS    REPEAT   CT CHEST - - ----CPET OIN MARCH 2023-----CARDIOLOGY DR SETH  -----\par is c/o post nasal drip\par utd w/influenza vac and covid vac x 2\par

## 2023-04-01 ENCOUNTER — APPOINTMENT (OUTPATIENT)
Dept: CT IMAGING | Facility: CLINIC | Age: 63
End: 2023-04-01

## 2023-04-01 ENCOUNTER — APPOINTMENT (OUTPATIENT)
Dept: CT IMAGING | Facility: CLINIC | Age: 63
End: 2023-04-01
Payer: MEDICARE

## 2023-04-01 PROCEDURE — 71250 CT THORAX DX C-: CPT

## 2023-07-24 ENCOUNTER — APPOINTMENT (OUTPATIENT)
Dept: PULMONOLOGY | Facility: CLINIC | Age: 63
End: 2023-07-24
Payer: MEDICARE

## 2023-07-24 VITALS
DIASTOLIC BLOOD PRESSURE: 72 MMHG | BODY MASS INDEX: 20.49 KG/M2 | HEART RATE: 69 BPM | HEIGHT: 64 IN | RESPIRATION RATE: 16 BRPM | WEIGHT: 120 LBS | OXYGEN SATURATION: 98 % | TEMPERATURE: 96.5 F | SYSTOLIC BLOOD PRESSURE: 131 MMHG

## 2023-07-24 DIAGNOSIS — M32.9 SYSTEMIC LUPUS ERYTHEMATOSUS, UNSPECIFIED: ICD-10-CM

## 2023-07-24 DIAGNOSIS — J84.9 INTERSTITIAL PULMONARY DISEASE, UNSPECIFIED: ICD-10-CM

## 2023-07-24 DIAGNOSIS — M34.9 SYSTEMIC SCLEROSIS, UNSPECIFIED: ICD-10-CM

## 2023-07-24 LAB
25(OH)D3 SERPL-MCNC: 73.8 NG/ML
ALBUMIN SERPL ELPH-MCNC: 4.7 G/DL
ALP BLD-CCNC: 39 U/L
ALT SERPL-CCNC: 11 U/L
ANION GAP SERPL CALC-SCNC: 15 MMOL/L
AST SERPL-CCNC: 20 U/L
BILIRUB SERPL-MCNC: 0.4 MG/DL
BUN SERPL-MCNC: 16 MG/DL
CALCIUM SERPL-MCNC: 9.4 MG/DL
CHLORIDE SERPL-SCNC: 99 MMOL/L
CHOLEST SERPL-MCNC: 218 MG/DL
CO2 SERPL-SCNC: 24 MMOL/L
CREAT SERPL-MCNC: 0.64 MG/DL
EGFR: 98 ML/MIN/1.73M2
ESTIMATED AVERAGE GLUCOSE: 103 MG/DL
FERRITIN SERPL-MCNC: 45 NG/ML
GLUCOSE SERPL-MCNC: 94 MG/DL
HBA1C MFR BLD HPLC: 5.2 %
HDLC SERPL-MCNC: 92 MG/DL
LDLC SERPL CALC-MCNC: 117 MG/DL
NONHDLC SERPL-MCNC: 126 MG/DL
NT-PROBNP SERPL-MCNC: <36 PG/ML
POTASSIUM SERPL-SCNC: 4.4 MMOL/L
PROT SERPL-MCNC: 7.3 G/DL
SODIUM SERPL-SCNC: 137 MMOL/L
TRIGL SERPL-MCNC: 48 MG/DL
TSH SERPL-ACNC: 1.67 UIU/ML
VIT B12 SERPL-MCNC: 420 PG/ML

## 2023-07-24 PROCEDURE — 36415 COLL VENOUS BLD VENIPUNCTURE: CPT

## 2023-07-24 PROCEDURE — 99215 OFFICE O/P EST HI 40 MIN: CPT | Mod: 25

## 2023-07-24 NOTE — HISTORY OF PRESENT ILLNESS
[Never] : never [TextBox_4] : This letter  is regarding your patient  who  attended pulmonary out patient office today.  I have reviewed  patient's  past history, social history, family history and medication list. I also  reviewed nurse practitioners/ and fellows  notes and assessment and agree with it.  \par The patient was referred by  [rheumatology]-----\par \par \par 62 yo F Never smoker w/ PMH CST on xarelto (dx ~2019), APLS, osteoporosis on prolia, lupus, and scleroderma treated with plaquenil for over 15 years. Reports being asymptomatic for over a decade. Presents today after referral from rheumatology. Reports in January 2021 began experiencing slow progressive systemic symptoms with swelling of digits, legs, and exertional shortness of breath. Underwent cardiac evaluation including echo and exercise stress test and reported normal. Follow up CT coronary March 28, 2022 with evidence of ground glass opacities. Treated with bronchodilators and 4 week taper of steroids with improvement in symptoms. Had joint flare in August treated supportively. Had endoscopy recently and reportedly normal\par \par You have a CAT scan report on this patient------No history of , fever, chills , rigors, chest pain, or hemoptysis. Questionable history of Raynaud's phenomenon. No h/o significant weight loss in last few months. No history of liver dysfunction , collagen vascular disorder or chronic thromboembolic disease. I would classify the patient's dyspnea as WHO  FUNCTIONAL CLASS I--------\par \par \par \par \par ----Echo  date-------2023------- no evidence of pulmonary hypertension\par ----Pft date----10/2022 normal lung volumes, decreased dlco-----\par \par cpet 2023---normal vo2 max ----31 ml/kg/min \par ----Ct scan date------- outside film-----bibasilar groundglass appearance\par \par --CT 4/2023\par IMPRESSION:\par Basilar and peribronchovascular predominant groundglass opacities with traction bronchiectasis, most consistent with an NSIP pattern of interstitial lung disease, particularly in the setting of reported connective tissue disease. Overall appearance is unchanged since May 2022.\par \par \par 12/2022 ILD/SLE/scleroderma/  ??DERMATOMYOSITIS [ POSITIVE  M-12 ANTIBODIES ] - on cellcept 500mg bid and plaquenil per Dr Jewell rheum\par No pulm complaints- cycles 3x weekly- did have some palpitations w/exercise- saw Dr Gunter in cardiology who ordered a stress test (sched for jan 2023)\par is c/o post nasal drip\par utd w/influenza vac and covid vac x 2\par \par \par 12/19/22 teledoc visit- no apparent distress\par tested positive for covid yesterday\par minimal symptoms- afebrile- already on antibiotic (zpack) and nasal spray\par \par march 2023------------  ILD/SLE/scleroderma/  ??DERMATOMYOSITIS [ POSITIVE  M-12 ANTIBODIES ] - on cellcept 500mg bid and plaquenil per Dr Jewell rheum ----NEEDS    REPEAT   CT CHEST - - ----CPET OIN MARCH 2023-----CARDIOLOGY DR SETH  -----\par is c/o post nasal drip\par utd w/influenza vac and covid vac x 2\par \par \par 7/2023 ILD/SLE- stable [NSIP ]------ on cellcept 1g bid and plaquenil 200mg\par exercises at least 3x weekly- peloton, swimming, walking\par no present pulm complaints\par CPET was normal VO2 max----- CT chest is stable--- changes suggestive of NSIP

## 2023-07-24 NOTE — DISCUSSION/SUMMARY
[FreeTextEntry1] : ---Assessment plan----------The patient has been referred here for further opinion regarding pulmonary problem,\par \par 64 yo F Never smoker w/ PMH CST on xarelto (dx ~2019), APLS, osteoporosis on prolia, lupus, and scleroderma [   SCL 70 positive   ]   treated with plaquenil for over 15 years. --- developed interstitial lung disease  POST COVID 2022----\par \par \par COVID POSITIVE \par 1  ILD --SLE/scleroderma/  ??DERMATOMYOSITIS [ POSITIVE  M-12 ANTIBODIES ]  cellcept 1g bid ---ASLO ON PLAQUENI L 200mg daily- follow up w/rheum - - - -\par \par pt has NSIP   pattern on  ct chest--stable-----on CPET  HER VO2 MA X---within normal limit-------- will continue with the present treatment\par 2) OSTOPOROSIS--ON PROLIA---\par 3)  APL-----on anticoagulation-Xarelto\par 4) NEEDS ECHO  annually\par 5) labs drawn in our office today, (repeat M2 antibody next year) \par 6) f/u in 6m\par \par \par Thanks for allowing  me to participate  in the care of this patient.  Patient at this time  will follow  the above mentioned recommendations and return back for follow up visit. If you have any questions  I can be reached  at # [phone] (office).\par \par [facility]ikh MD, [facility] \par

## 2023-07-24 NOTE — END OF VISIT
[Time Spent: ___ minutes] : I have spent [unfilled] minutes of time on the encounter. [FreeTextEntry3] : \par  I, Dr. Jose Shaikh  personally performed the evaluation and management (E/M) services for this established patient who presents today with (a) new problem(s)/exacerbation of (an) existing condition(s). That E/M includes conducting the clinically appropriate interval history &/or exam, assessing all new/exacerbated conditions, and establishing a new plan of care. Today, my JERMAIN, Zulema Sheets NP, , was here to observe my evaluation and management service for this new problem/exacerbated condition and follow the plan of care established by me going forward.\par

## 2023-10-03 ENCOUNTER — APPOINTMENT (OUTPATIENT)
Dept: PULMONOLOGY | Facility: CLINIC | Age: 63
End: 2023-10-03
Payer: MEDICARE

## 2023-10-03 DIAGNOSIS — R05.3 CHRONIC COUGH: ICD-10-CM

## 2023-10-03 PROCEDURE — 99443: CPT | Mod: 95

## 2023-10-03 RX ORDER — PREDNISONE 10 MG/1
10 TABLET ORAL DAILY
Qty: 14 | Refills: 1 | Status: ACTIVE | COMMUNITY
Start: 2023-10-03 | End: 1900-01-01

## 2023-10-03 RX ORDER — BENZONATATE 100 MG/1
100 CAPSULE ORAL 3 TIMES DAILY
Qty: 60 | Refills: 1 | Status: ACTIVE | COMMUNITY
Start: 2023-10-03 | End: 1900-01-01

## 2023-10-04 LAB
RAPID RVP RESULT: DETECTED
RSV RNA SPEC QL NAA+PROBE: DETECTED
SARS-COV-2 RNA PNL RESP NAA+PROBE: NOT DETECTED

## 2023-10-05 ENCOUNTER — NON-APPOINTMENT (OUTPATIENT)
Age: 63
End: 2023-10-05

## 2023-10-06 RX ORDER — MYCOPHENOLATE MOFETIL 500 MG/1
500 TABLET ORAL
Qty: 360 | Refills: 3 | Status: ACTIVE | COMMUNITY
Start: 2023-10-06 | End: 1900-01-01

## 2023-12-08 ENCOUNTER — RESULT REVIEW (OUTPATIENT)
Age: 63
End: 2023-12-08

## 2024-01-11 ENCOUNTER — OUTPATIENT (OUTPATIENT)
Dept: OUTPATIENT SERVICES | Facility: HOSPITAL | Age: 64
LOS: 1 days | End: 2024-01-11
Payer: MEDICARE

## 2024-01-11 ENCOUNTER — APPOINTMENT (OUTPATIENT)
Dept: CV DIAGNOSITCS | Facility: HOSPITAL | Age: 64
End: 2024-01-11

## 2024-01-11 DIAGNOSIS — M34.9 SYSTEMIC SCLEROSIS, UNSPECIFIED: ICD-10-CM

## 2024-01-11 PROCEDURE — 93306 TTE W/DOPPLER COMPLETE: CPT

## 2024-01-11 PROCEDURE — 93306 TTE W/DOPPLER COMPLETE: CPT | Mod: 26

## 2024-01-22 ENCOUNTER — APPOINTMENT (OUTPATIENT)
Dept: PULMONOLOGY | Facility: CLINIC | Age: 64
End: 2024-01-22
Payer: MEDICARE

## 2024-01-22 VITALS
HEART RATE: 65 BPM | DIASTOLIC BLOOD PRESSURE: 82 MMHG | TEMPERATURE: 97.9 F | BODY MASS INDEX: 20.66 KG/M2 | RESPIRATION RATE: 15 BRPM | SYSTOLIC BLOOD PRESSURE: 131 MMHG | HEIGHT: 64 IN | WEIGHT: 121 LBS | OXYGEN SATURATION: 96 %

## 2024-01-22 PROCEDURE — 94010 BREATHING CAPACITY TEST: CPT

## 2024-01-22 PROCEDURE — 99215 OFFICE O/P EST HI 40 MIN: CPT | Mod: 25

## 2024-01-22 PROCEDURE — ZZZZZ: CPT

## 2024-01-22 RX ORDER — AZITHROMYCIN 250 MG/1
250 TABLET, FILM COATED ORAL
Qty: 1 | Refills: 0 | Status: DISCONTINUED | COMMUNITY
Start: 2023-10-03 | End: 2024-01-22

## 2024-01-22 NOTE — DISCUSSION/SUMMARY
[FreeTextEntry1] : ---Assessment plan----------The patient has been referred here for further opinion regarding pulmonary problem,  63 yo F Never smoker w/ PMH CST on xarelto (dx ~2019), APLS, osteoporosis on prolia, lupus, and scleroderma [   SCL 70 positive   ]   treated with plaquenil for over 15 years. --- developed interstitial lung disease  POST COVID 2022----    1  ILD --SLE/scleroderma/  ??DERMATOMYOSITIS [ POSITIVE  M-12 ANTIBODIES ]  cellcept 1g bid ---ASLO ON PLAQUENI L 200mg daily- follow up w/rheum - - - -  pt has NSIP   pattern on  ct chest--stable-----on CPET  HER VO2 MA X---within normal limit-------- will continue with the present treatment-needs repeat HRCT chest ----- ---encourage patient for breathing exercises---- 2) f/u in may 2024   Thanks for allowing  me to participate  in the care of this patient.  Patient at this time  will follow  the above mentioned recommendations and return back for follow up visit. If you have any questions  I can be reached  at # [phone] (office).  Jose Shaikh MD, [facility]

## 2024-01-22 NOTE — END OF VISIT
[FreeTextEntry3] :   I, Dr. Jose Shaikh  personally performed the evaluation and management (E/M) services for this established patient who presents today with (a) new problem(s)/exacerbation of (an) existing condition(s). That E/M includes conducting the clinically appropriate interval history &/or exam, assessing all new/exacerbated conditions, and establishing a new plan of care. Today, my JERMAIN, Zulema Sheets NP, , was here to observe my evaluation and management service for this new problem/exacerbated condition and follow the plan of care established by me going forward. [Time Spent: ___ minutes] : I have spent [unfilled] minutes of time on the encounter.

## 2024-01-22 NOTE — PHYSICAL EXAM
[No Acute Distress] : no acute distress [Normal Oropharynx] : normal oropharynx [Normal Appearance] : normal appearance [Normal Rate/Rhythm] : normal rate/rhythm [No Neck Mass] : no neck mass [Normal S1, S2] : normal s1, s2 [No Murmurs] : no murmurs [No Resp Distress] : no resp distress [Clear to Auscultation Bilaterally] : clear to auscultation bilaterally [No Abnormalities] : no abnormalities [Benign] : benign [Normal Gait] : normal gait [No Cyanosis] : no cyanosis [No Clubbing] : no clubbing [No Edema] : no edema [FROM] : FROM [Normal Color/ Pigmentation] : normal color/ pigmentation [No Focal Deficits] : no focal deficits [Oriented x3] : oriented x3 [Normal Affect] : normal affect

## 2024-01-22 NOTE — HISTORY OF PRESENT ILLNESS
[Never] : never [TextBox_4] : This letter  is regarding your patient  who  attended pulmonary out patient office today.  I have reviewed  patient's  past history, social history, family history and medication list. I also  reviewed nurse practitioners/ and fellows  notes and assessment and agree with it.   The patient was referred by  [rheumatology]-----   63 yo F Never smoker w/ PMH CST on xarelto (dx ~2019), APLS, osteoporosis on prolia, lupus, and scleroderma treated with plaquenil for over 15 years. Reports being asymptomatic for over a decade. Presents today after referral from rheumatology. Reports in January 2021 began experiencing slow progressive systemic symptoms with swelling of digits, legs, and exertional shortness of breath. Underwent cardiac evaluation including echo and exercise stress test and reported normal. Follow up CT coronary March 28, 2022 with evidence of ground glass opacities. Treated with bronchodilators and 4 week taper of steroids with improvement in symptoms. Had joint flare in August treated supportively. Had endoscopy recently and reportedly normal  You have a CAT scan report on this patient------No history of , fever, chills , rigors, chest pain, or hemoptysis. Questionable history of Raynaud's phenomenon. No h/o significant weight loss in last few months. No history of liver dysfunction , collagen vascular disorder or chronic thromboembolic disease. I would classify the patient's dyspnea as WHO  FUNCTIONAL CLASS I--------     ----Echo  date-------2023------- no evidence of pulmonary hypertension ----1/2024 -- echo: CONCLUSIONS:  1. Left ventricular systolic function is normal with an ejection fraction of 68 % by 3D.  2. No prior echocardiogram is available for comparison.  ----Pft date----10/2022 normal lung volumes, decreased dlco-----  cpet 2023---normal vo2 max ----31 ml/kg/min  ----Ct scan date------- outside film-----bibasilar groundglass appearance  --CT 4/2023 IMPRESSION: Basilar and peribronchovascular predominant groundglass opacities with traction bronchiectasis, most consistent with an NSIP pattern of interstitial lung disease, particularly in the setting of reported connective tissue disease. Overall appearance is unchanged since May 2022.   12/2022 ILD/SLE/scleroderma/  ??DERMATOMYOSITIS [ POSITIVE  M-12 ANTIBODIES ] - on cellcept 500mg bid and plaquenil per Dr Jewell rheum No pulm complaints- cycles 3x weekly- did have some palpitations w/exercise- saw Dr Gunter in cardiology who ordered a stress test (sched for jan 2023) is c/o post nasal drip utd w/influenza vac and covid vac x 2   12/19/22 teledoc visit- no apparent distress tested positive for covid yesterday minimal symptoms- afebrile- already on antibiotic (zpack) and nasal spray  march 2023------------  ILD/SLE/scleroderma/  ??DERMATOMYOSITIS [ POSITIVE  M-12 ANTIBODIES ] - on cellcept 500mg bid and plaquenil per Dr Jewell rheum ----NEEDS    REPEAT   CT CHEST - - ----CPET OIN MARCH 2023-----CARDIOLOGY DR SETH  ----- is c/o post nasal drip utd w/influenza vac and covid vac x 2   7/2023 ILD/SLE- stable [NSIP ]------ on cellcept 1g bid and plaquenil 200mg exercises at least 3x weekly- peloton, swimming, walking no present pulm complaints CPET was normal VO2 max----- CT chest is stable--- changes suggestive of NSIP  10/2023 ILD/SLE- stable [NSIP ]------ on cellcept 1g bid and plaquenil 200mg TTM with pt--acute visit for URI- cough/congestion x couple of days, afebrile, reports self covid swab is negative  1/2024 spirometry  normal   1/2024 echo: CONCLUSIONS:  1. Left ventricular systolic function is normal with an ejection fraction of 68 % by 3D.  2. No prior echocardiogram is available for comparison.  1/2024 ild on cellcept 2 g and plaquenil 200mg daily follow rheum Dr Jewell, c/o Painful fingers d/t Raynauds exercises daily without problem -nees ct chest-----otherwise feels stable from pulmonary point of view

## 2024-07-02 ENCOUNTER — APPOINTMENT (OUTPATIENT)
Dept: ORTHOPEDIC SURGERY | Facility: CLINIC | Age: 64
End: 2024-07-02
Payer: MEDICARE

## 2024-07-02 DIAGNOSIS — I10 ESSENTIAL (PRIMARY) HYPERTENSION: ICD-10-CM

## 2024-07-02 DIAGNOSIS — R22.32 LOCALIZED SWELLING, MASS AND LUMP, LEFT UPPER LIMB: ICD-10-CM

## 2024-07-02 DIAGNOSIS — Q24.5 MALFORMATION OF CORONARY VESSELS: ICD-10-CM

## 2024-07-02 PROCEDURE — 99204 OFFICE O/P NEW MOD 45 MIN: CPT

## 2024-07-02 RX ORDER — MYCOPHENOLATE MOFETIL 500 MG/1
TABLET, FILM COATED ORAL
Refills: 0 | Status: ACTIVE | COMMUNITY

## 2024-07-02 RX ORDER — RIVAROXABAN 20 MG/1
20 TABLET, FILM COATED ORAL
Refills: 0 | Status: ACTIVE | COMMUNITY

## 2024-07-02 RX ORDER — ATENOLOL 50 MG/1
TABLET ORAL
Refills: 0 | Status: ACTIVE | COMMUNITY

## 2024-07-02 RX ORDER — HYDROXYCHLOROQUINE SULFATE 200 MG/1
200 TABLET ORAL
Refills: 0 | Status: ACTIVE | COMMUNITY

## 2024-07-02 RX ORDER — AMLODIPINE BESYLATE 5 MG/1
TABLET ORAL
Refills: 0 | Status: ACTIVE | COMMUNITY

## 2024-08-01 RX ORDER — ACETAMINOPHEN AND CODEINE PHOSPHATE 300; 30 MG/1; MG/1
300-30 TABLET ORAL
Qty: 12 | Refills: 0 | Status: ACTIVE | COMMUNITY
Start: 2024-08-01 | End: 1900-01-01

## 2024-08-02 ENCOUNTER — APPOINTMENT (OUTPATIENT)
Age: 64
End: 2024-08-02

## 2024-08-02 PROCEDURE — 26116 EXC HAND TUM DEEP < 1.5 CM: CPT | Mod: LT

## 2024-08-02 PROCEDURE — 26116 EXC HAND TUM DEEP < 1.5 CM: CPT | Mod: AS,LT

## 2024-08-14 ENCOUNTER — APPOINTMENT (OUTPATIENT)
Dept: ORTHOPEDIC SURGERY | Facility: CLINIC | Age: 64
End: 2024-08-14
Payer: MEDICARE

## 2024-08-14 DIAGNOSIS — M72.0 PALMAR FASCIAL FIBROMATOSIS [DUPUYTREN]: ICD-10-CM

## 2024-08-14 PROCEDURE — 99024 POSTOP FOLLOW-UP VISIT: CPT

## 2024-08-14 NOTE — IMAGING
[de-identified] : left hand: wound clean dry and intact no erythema no drainage FAROM NV unchanged sutures removed

## 2024-08-14 NOTE — HISTORY OF PRESENT ILLNESS
[3] : 3 [0] : 0 [Dull/Aching] : dull/aching [Intermittent] : intermittent [Household chores] : household chores [de-identified] : DOS: 8/2/2024 LEFT HAND MASS EXCISION  8/14/2024: Pt here for POV3 - sutures in place. Pt with mild discomfort.  7/2/24:  Pt has a painful mass in her left hand PARKER, RN at [facility] wound care [place] [] : no [FreeTextEntry1] : Right

## 2024-08-14 NOTE — ASSESSMENT
[FreeTextEntry1] : pt doing well.  will rtw full duty 8/15/2024. pathology report reviewed with patient. Pt will rto prn.

## 2024-09-02 ENCOUNTER — RX RENEWAL (OUTPATIENT)
Age: 64
End: 2024-09-02

## 2024-09-12 ENCOUNTER — APPOINTMENT (OUTPATIENT)
Dept: ORTHOPEDIC SURGERY | Facility: CLINIC | Age: 64
End: 2024-09-12
Payer: MEDICARE

## 2024-09-12 DIAGNOSIS — M72.0 PALMAR FASCIAL FIBROMATOSIS [DUPUYTREN]: ICD-10-CM

## 2024-09-12 PROCEDURE — 99024 POSTOP FOLLOW-UP VISIT: CPT

## 2024-09-12 NOTE — HISTORY OF PRESENT ILLNESS
[3] : 3 [0] : 0 [Dull/Aching] : dull/aching [Intermittent] : intermittent [Household chores] : household chores [de-identified] : DOS: 8/2/2024 LEFT HAND MASS EXCISION  9/12/24: Pt here for PO4. Pt has mild pain and discomfort. Pt reports pain when pressure is applied on the excision site.  8/14/2024: Pt here for POV3 - sutures in place. Pt with mild discomfort.  7/2/24:  Pt has a painful mass in her left hand PARKER RN at [facility] wound care [place] [] : no [FreeTextEntry1] : Right

## 2024-09-12 NOTE — ASSESSMENT
[FreeTextEntry1] : The patient was advised of the diagnosis. The natural history of the pathology was explained in full to the patient in layman's terms. All questions were answered. The risks and benefits of surgical and non-surgical treatment alternatives were explained in full to the patient.  activity modification pathology report reviewed with patient.  PRN

## 2024-09-12 NOTE — IMAGING
[de-identified] : left hand: wound clean dry and intact no erythema no drainage FAROM NV unchanged

## 2024-09-23 ENCOUNTER — APPOINTMENT (OUTPATIENT)
Dept: PULMONOLOGY | Facility: CLINIC | Age: 64
End: 2024-09-23
Payer: MEDICARE

## 2024-09-23 VITALS
TEMPERATURE: 98.1 F | RESPIRATION RATE: 15 BRPM | BODY MASS INDEX: 20.83 KG/M2 | WEIGHT: 122 LBS | OXYGEN SATURATION: 96 % | HEART RATE: 58 BPM | DIASTOLIC BLOOD PRESSURE: 72 MMHG | HEIGHT: 64 IN | SYSTOLIC BLOOD PRESSURE: 118 MMHG

## 2024-09-23 DIAGNOSIS — R93.89 ABNORMAL FINDINGS ON DIAGNOSTIC IMAGING OF OTHER SPECIFIED BODY STRUCTURES: ICD-10-CM

## 2024-09-23 DIAGNOSIS — D68.61 ANTIPHOSPHOLIPID SYNDROME: ICD-10-CM

## 2024-09-23 DIAGNOSIS — J84.9 INTERSTITIAL PULMONARY DISEASE, UNSPECIFIED: ICD-10-CM

## 2024-09-23 DIAGNOSIS — Z23 ENCOUNTER FOR IMMUNIZATION: ICD-10-CM

## 2024-09-23 PROCEDURE — G0008: CPT

## 2024-09-23 PROCEDURE — 36415 COLL VENOUS BLD VENIPUNCTURE: CPT

## 2024-09-23 PROCEDURE — 90662 IIV NO PRSV INCREASED AG IM: CPT

## 2024-09-23 PROCEDURE — 99214 OFFICE O/P EST MOD 30 MIN: CPT

## 2024-09-23 RX ORDER — AZITHROMYCIN 250 MG/1
250 TABLET, FILM COATED ORAL
Qty: 1 | Refills: 0 | Status: ACTIVE | COMMUNITY
Start: 2024-09-23 | End: 1900-01-01

## 2024-09-24 LAB
25(OH)D3 SERPL-MCNC: 76.1 NG/ML
ALBUMIN SERPL ELPH-MCNC: 4.6 G/DL
ALP BLD-CCNC: 45 U/L
ALT SERPL-CCNC: 10 U/L
ANION GAP SERPL CALC-SCNC: 15 MMOL/L
AST SERPL-CCNC: 20 U/L
BILIRUB SERPL-MCNC: 0.5 MG/DL
BUN SERPL-MCNC: 22 MG/DL
CALCIUM SERPL-MCNC: 9.7 MG/DL
CHLORIDE SERPL-SCNC: 102 MMOL/L
CO2 SERPL-SCNC: 24 MMOL/L
CREAT SERPL-MCNC: 0.69 MG/DL
EGFR: 96 ML/MIN/1.73M2
GLUCOSE SERPL-MCNC: 102 MG/DL
HCT VFR BLD CALC: 42.4 %
HGB BLD-MCNC: 14 G/DL
MCHC RBC-ENTMCNC: 32.8 PG
MCHC RBC-ENTMCNC: 33 GM/DL
MCV RBC AUTO: 99.3 FL
NT-PROBNP SERPL-MCNC: 79 PG/ML
PLATELET # BLD AUTO: 244 K/UL
POTASSIUM SERPL-SCNC: 5 MMOL/L
PROT SERPL-MCNC: 7.7 G/DL
RBC # BLD: 4.27 M/UL
RBC # FLD: 13.3 %
SODIUM SERPL-SCNC: 141 MMOL/L
VIT B12 SERPL-MCNC: 611 PG/ML
WBC # FLD AUTO: 5.75 K/UL

## 2024-09-25 NOTE — END OF VISIT
[FreeTextEntry3] :   I, Dr. Jose Shaikh  personally performed the evaluation and management (E/M) services for this established patient who presents today with (a) new problem(s)/exacerbation of (an) existing condition(s). That E/M includes conducting the clinically appropriate interval history &/or exam, assessing all new/exacerbated conditions, and establishing a new plan of care. Today, my JERMAIN, Zulema Sheets NP, , was here to observe my evaluation and management service for this new problem/exacerbated condition and follow the plan of care established by me going forward. [Time Spent: ___ minutes] : I have spent [unfilled] minutes of time on the encounter which excludes teaching and separately reported services.

## 2024-09-25 NOTE — DISCUSSION/SUMMARY
[FreeTextEntry1] : ---Assessment plan----------The patient has been referred here for further opinion regarding pulmonary problem,  65 yo F Never smoker w/ PMH CST on xarelto (dx ~2019), APLS, osteoporosis on prolia, lupus, and scleroderma [   SCL 70 positive   ]   treated with plaquenil for over 15 years. --- developed interstitial lung disease  POST COVID 2022----    1  ILD --SLE/scleroderma/  ??DERMATOMYOSITIS [ POSITIVE  M-12 ANTIBODIES ]  cellcept 1g bid ---ASLO ON PLAQUENI L 200mg daily- follow up w/rheum - - - -  pt has NSIP   pattern on  ct chest--stable-----on CPET  HER VO2 MA X---within normal limit-------- will continue with the present treatment- -recent ct chest w/new/enlarging ricky bronchial  nodularity in anterior ALINA-start zpack-repeat CT CHEST 12/2024 2) influenza vac given 3) follow up w/cards- on xarelto, atenolol- needs echo 2025 4) labs drawn in office today 5) f/u in jan 2025with pft/6mwt   Thanks for allowing  me to participate  in the care of this patient.  Patient at this time  will follow  the above mentioned recommendations and return back for follow up visit. If you have any questions  I can be reached  at # [phone] (office).  Jose Shaikh MD, [facility]

## 2024-09-25 NOTE — DISCUSSION/SUMMARY
[FreeTextEntry1] : ---Assessment plan----------The patient has been referred here for further opinion regarding pulmonary problem,  65 yo F Never smoker w/ PMH CST on xarelto (dx ~2019), APLS, osteoporosis on prolia, lupus, and scleroderma [   SCL 70 positive   ]   treated with plaquenil for over 15 years. --- developed interstitial lung disease  POST COVID 2022----    1  ILD --SLE/scleroderma/  ??DERMATOMYOSITIS [ POSITIVE  M-12 ANTIBODIES ]  cellcept 1g bid ---ASLO ON PLAQUENI L 200mg daily- follow up w/rheum - - - -  pt has NSIP   pattern on  ct chest--stable-----on CPET  HER VO2 MA X---within normal limit-------- will continue with the present treatment- -recent ct chest w/new/enlarging ricky bronchial  nodularity in anterior ALINA-start zpack-repeat CT CHEST 12/2024 2) influenza vac given 3) follow up w/cards- on xarelto, atenolol- needs echo 2025 4) labs drawn in office today 5) f/u in jan 2025with pft/6mwt   Thanks for allowing  me to participate  in the care of this patient.  Patient at this time  will follow  the above mentioned recommendations and return back for follow up visit. If you have any questions  I can be reached  at # [phone] (office).  Jose Shaikh MD, [facility]

## 2024-09-25 NOTE — DISCUSSION/SUMMARY
[FreeTextEntry1] : ---Assessment plan----------The patient has been referred here for further opinion regarding pulmonary problem,  63 yo F Never smoker w/ PMH CST on xarelto (dx ~2019), APLS, osteoporosis on prolia, lupus, and scleroderma [   SCL 70 positive   ]   treated with plaquenil for over 15 years. --- developed interstitial lung disease  POST COVID 2022----    1  ILD --SLE/scleroderma/  ??DERMATOMYOSITIS [ POSITIVE  M-12 ANTIBODIES ]  cellcept 1g bid ---ASLO ON PLAQUENI L 200mg daily- follow up w/rheum - - - -  pt has NSIP   pattern on  ct chest--stable-----on CPET  HER VO2 MA X---within normal limit-------- will continue with the present treatment- -recent ct chest w/new/enlarging ricky bronchial  nodularity in anterior ALINA-start zpack-repeat CT CHEST 12/2024 2) influenza vac given 3) follow up w/cards- on xarelto, atenolol- needs echo 2025 4) labs drawn in office today 5) f/u in jan 2025with pft/6mwt   Thanks for allowing  me to participate  in the care of this patient.  Patient at this time  will follow  the above mentioned recommendations and return back for follow up visit. If you have any questions  I can be reached  at # [phone] (office).  Jose Shaikh MD, [facility]

## 2024-09-25 NOTE — HISTORY OF PRESENT ILLNESS
[Never] : never [TextBox_4] : This letter  is regarding your patient  who  attended pulmonary out patient office today.  I have reviewed  patient's  past history, social history, family history and medication list. I also  reviewed nurse practitioners/ and fellows  notes and assessment and agree with it.   The patient was referred by  [rheumatology]-----   65 yo F Never smoker w/ PMH CST on xarelto (dx ~2019), APLS, osteoporosis on prolia, lupus, and scleroderma treated with plaquenil for over 15 years. Reports being asymptomatic for over a decade. Presents today after referral from rheumatology. Reports in January 2021 began experiencing slow progressive systemic symptoms with swelling of digits, legs, and exertional shortness of breath. Underwent cardiac evaluation including echo and exercise stress test and reported normal. Follow up CT coronary March 28, 2022 with evidence of ground glass opacities. Treated with bronchodilators and 4 week taper of steroids with improvement in symptoms. Had joint flare in August treated supportively. Had endoscopy recently and reportedly normal  You have a CAT scan report on this patient------No history of , fever, chills , rigors, chest pain, or hemoptysis. Questionable history of Raynaud's phenomenon. No h/o significant weight loss in last few months. No history of liver dysfunction , collagen vascular disorder or chronic thromboembolic disease. I would classify the patient's dyspnea as WHO  FUNCTIONAL CLASS I--------     ----Echo  date-------2023------- no evidence of pulmonary hypertension ----1/2024 -- echo: CONCLUSIONS:  1. Left ventricular systolic function is normal with an ejection fraction of 68 % by 3D.  2. No prior echocardiogram is available for comparison.  ----Pft date----10/2022 normal lung volumes, decreased dlco-----  cpet 2023---normal vo2 max ----31 ml/kg/min  ----Ct scan date------- outside film-----bibasilar groundglass appearance  --CT 4/2023 IMPRESSION: Basilar and peribronchovascular predominant groundglass opacities with traction bronchiectasis, most consistent with an NSIP pattern of interstitial lung disease, particularly in the setting of reported connective tissue disease. Overall appearance is unchanged since May 2022.   12/2022 ILD/SLE/scleroderma/  ??DERMATOMYOSITIS [ POSITIVE  M-12 ANTIBODIES ] - on cellcept 500mg bid and plaquenil per Dr Jewell rheum No pulm complaints- cycles 3x weekly- did have some palpitations w/exercise- saw Dr Gunter in cardiology who ordered a stress test (sched for jan 2023) is c/o post nasal drip utd w/influenza vac and covid vac x 2   12/19/22 teledoc visit- no apparent distress tested positive for covid yesterday minimal symptoms- afebrile- already on antibiotic (zpack) and nasal spray  march 2023------------  ILD/SLE/scleroderma/  ??DERMATOMYOSITIS [ POSITIVE  M-12 ANTIBODIES ] - on cellcept 500mg bid and plaquenil per Dr Jewell rheum ----NEEDS    REPEAT   CT CHEST - - ----CPET OIN MARCH 2023-----CARDIOLOGY DR SETH  ----- is c/o post nasal drip utd w/influenza vac and covid vac x 2   7/2023 ILD/SLE- stable [NSIP ]------ on cellcept 1g bid and plaquenil 200mg exercises at least 3x weekly- peloton, swimming, walking no present pulm complaints CPET was normal VO2 max----- CT chest is stable--- changes suggestive of NSIP  10/2023 ILD/SLE- stable [NSIP ]------ on cellcept 1g bid and plaquenil 200mg TTM with pt--acute visit for URI- cough/congestion x couple of days, afebrile, reports self covid swab is negative  1/2024 spirometry  normal   1/2024 echo: CONCLUSIONS:  1. Left ventricular systolic function is normal with an ejection fraction of 68 % by 3D.  2. No prior echocardiogram is available for comparison.  1/2024 ild on cellcept 2 g and plaquenil 200mg daily follow rheum Dr Jewell, c/o Painful fingers d/t Raynauds exercises daily without problem -nees ct chest-----otherwise feels stable from pulmonary point of view  4/2024 CT chest Basilar and peribronchovascular predominant groundglass opacities with traction bronchiectasis, most consistent with an NSIP pattern of interstitial lung disease, particularly in the setting of reported connective tissue disease. Overall appearance is unchanged since May 2022.  9/2024 CT chest new/enlarging peribronch nodularities in anterior ALINA  9/2024  ild on cellcept 2 g and plaquenil 200mg daily follow rheum Dr Jewell doing well from pulm perspective s/p CP while exercising -led to cardiac cath at [facility] which pt reports was negative except for myocardial bridging-started on atenolon-no further CP

## 2024-09-25 NOTE — HISTORY OF PRESENT ILLNESS
[Never] : never [TextBox_4] : This letter  is regarding your patient  who  attended pulmonary out patient office today.  I have reviewed  patient's  past history, social history, family history and medication list. I also  reviewed nurse practitioners/ and fellows  notes and assessment and agree with it.   The patient was referred by  [rheumatology]-----   65 yo F Never smoker w/ PMH CST on xarelto (dx ~2019), APLS, osteoporosis on prolia, lupus, and scleroderma treated with plaquenil for over 15 years. Reports being asymptomatic for over a decade. Presents today after referral from rheumatology. Reports in January 2021 began experiencing slow progressive systemic symptoms with swelling of digits, legs, and exertional shortness of breath. Underwent cardiac evaluation including echo and exercise stress test and reported normal. Follow up CT coronary March 28, 2022 with evidence of ground glass opacities. Treated with bronchodilators and 4 week taper of steroids with improvement in symptoms. Had joint flare in August treated supportively. Had endoscopy recently and reportedly normal  You have a CAT scan report on this patient------No history of , fever, chills , rigors, chest pain, or hemoptysis. Questionable history of Raynaud's phenomenon. No h/o significant weight loss in last few months. No history of liver dysfunction , collagen vascular disorder or chronic thromboembolic disease. I would classify the patient's dyspnea as WHO  FUNCTIONAL CLASS I--------     ----Echo  date-------2023------- no evidence of pulmonary hypertension ----1/2024 -- echo: CONCLUSIONS:  1. Left ventricular systolic function is normal with an ejection fraction of 68 % by 3D.  2. No prior echocardiogram is available for comparison.  ----Pft date----10/2022 normal lung volumes, decreased dlco-----  cpet 2023---normal vo2 max ----31 ml/kg/min  ----Ct scan date------- outside film-----bibasilar groundglass appearance  --CT 4/2023 IMPRESSION: Basilar and peribronchovascular predominant groundglass opacities with traction bronchiectasis, most consistent with an NSIP pattern of interstitial lung disease, particularly in the setting of reported connective tissue disease. Overall appearance is unchanged since May 2022.   12/2022 ILD/SLE/scleroderma/  ??DERMATOMYOSITIS [ POSITIVE  M-12 ANTIBODIES ] - on cellcept 500mg bid and plaquenil per Dr Jewell rheum No pulm complaints- cycles 3x weekly- did have some palpitations w/exercise- saw Dr Gunter in cardiology who ordered a stress test (sched for jan 2023) is c/o post nasal drip utd w/influenza vac and covid vac x 2   12/19/22 teledoc visit- no apparent distress tested positive for covid yesterday minimal symptoms- afebrile- already on antibiotic (zpack) and nasal spray  march 2023------------  ILD/SLE/scleroderma/  ??DERMATOMYOSITIS [ POSITIVE  M-12 ANTIBODIES ] - on cellcept 500mg bid and plaquenil per Dr Jewell rheum ----NEEDS    REPEAT   CT CHEST - - ----CPET OIN MARCH 2023-----CARDIOLOGY DR SETH  ----- is c/o post nasal drip utd w/influenza vac and covid vac x 2   7/2023 ILD/SLE- stable [NSIP ]------ on cellcept 1g bid and plaquenil 200mg exercises at least 3x weekly- peloton, swimming, walking no present pulm complaints CPET was normal VO2 max----- CT chest is stable--- changes suggestive of NSIP  10/2023 ILD/SLE- stable [NSIP ]------ on cellcept 1g bid and plaquenil 200mg TTM with pt--acute visit for URI- cough/congestion x couple of days, afebrile, reports self covid swab is negative  1/2024 spirometry  normal   1/2024 echo: CONCLUSIONS:  1. Left ventricular systolic function is normal with an ejection fraction of 68 % by 3D.  2. No prior echocardiogram is available for comparison.  1/2024 ild on cellcept 2 g and plaquenil 200mg daily follow rheum Dr Jewell, c/o Painful fingers d/t Raynauds exercises daily without problem -nees ct chest-----otherwise feels stable from pulmonary point of view  4/2024 CT chest Basilar and peribronchovascular predominant groundglass opacities with traction bronchiectasis, most consistent with an NSIP pattern of interstitial lung disease, particularly in the setting of reported connective tissue disease. Overall appearance is unchanged since May 2022.  9/2024 CT chest new/enlarging peribronch nodularities in anterior ALINA  9/2024  ild on cellcept 2 g and plaquenil 200mg daily follow rheum Dr Jewell doing well from pulm perspective s/p CP while exercising -led to cardiac cath at [facility] which pt reports was negative except for myocardial bridging-started on atenolon-no further CP

## 2024-09-25 NOTE — HISTORY OF PRESENT ILLNESS
[Never] : never [TextBox_4] : This letter  is regarding your patient  who  attended pulmonary out patient office today.  I have reviewed  patient's  past history, social history, family history and medication list. I also  reviewed nurse practitioners/ and fellows  notes and assessment and agree with it.   The patient was referred by  [rheumatology]-----   63 yo F Never smoker w/ PMH CST on xarelto (dx ~2019), APLS, osteoporosis on prolia, lupus, and scleroderma treated with plaquenil for over 15 years. Reports being asymptomatic for over a decade. Presents today after referral from rheumatology. Reports in January 2021 began experiencing slow progressive systemic symptoms with swelling of digits, legs, and exertional shortness of breath. Underwent cardiac evaluation including echo and exercise stress test and reported normal. Follow up CT coronary March 28, 2022 with evidence of ground glass opacities. Treated with bronchodilators and 4 week taper of steroids with improvement in symptoms. Had joint flare in August treated supportively. Had endoscopy recently and reportedly normal  You have a CAT scan report on this patient------No history of , fever, chills , rigors, chest pain, or hemoptysis. Questionable history of Raynaud's phenomenon. No h/o significant weight loss in last few months. No history of liver dysfunction , collagen vascular disorder or chronic thromboembolic disease. I would classify the patient's dyspnea as WHO  FUNCTIONAL CLASS I--------     ----Echo  date-------2023------- no evidence of pulmonary hypertension ----1/2024 -- echo: CONCLUSIONS:  1. Left ventricular systolic function is normal with an ejection fraction of 68 % by 3D.  2. No prior echocardiogram is available for comparison.  ----Pft date----10/2022 normal lung volumes, decreased dlco-----  cpet 2023---normal vo2 max ----31 ml/kg/min  ----Ct scan date------- outside film-----bibasilar groundglass appearance  --CT 4/2023 IMPRESSION: Basilar and peribronchovascular predominant groundglass opacities with traction bronchiectasis, most consistent with an NSIP pattern of interstitial lung disease, particularly in the setting of reported connective tissue disease. Overall appearance is unchanged since May 2022.   12/2022 ILD/SLE/scleroderma/  ??DERMATOMYOSITIS [ POSITIVE  M-12 ANTIBODIES ] - on cellcept 500mg bid and plaquenil per Dr Jewell rheum No pulm complaints- cycles 3x weekly- did have some palpitations w/exercise- saw Dr Gunter in cardiology who ordered a stress test (sched for jan 2023) is c/o post nasal drip utd w/influenza vac and covid vac x 2   12/19/22 teledoc visit- no apparent distress tested positive for covid yesterday minimal symptoms- afebrile- already on antibiotic (zpack) and nasal spray  march 2023------------  ILD/SLE/scleroderma/  ??DERMATOMYOSITIS [ POSITIVE  M-12 ANTIBODIES ] - on cellcept 500mg bid and plaquenil per Dr Jewell rheum ----NEEDS    REPEAT   CT CHEST - - ----CPET OIN MARCH 2023-----CARDIOLOGY DR SETH  ----- is c/o post nasal drip utd w/influenza vac and covid vac x 2   7/2023 ILD/SLE- stable [NSIP ]------ on cellcept 1g bid and plaquenil 200mg exercises at least 3x weekly- peloton, swimming, walking no present pulm complaints CPET was normal VO2 max----- CT chest is stable--- changes suggestive of NSIP  10/2023 ILD/SLE- stable [NSIP ]------ on cellcept 1g bid and plaquenil 200mg TTM with pt--acute visit for URI- cough/congestion x couple of days, afebrile, reports self covid swab is negative  1/2024 spirometry  normal   1/2024 echo: CONCLUSIONS:  1. Left ventricular systolic function is normal with an ejection fraction of 68 % by 3D.  2. No prior echocardiogram is available for comparison.  1/2024 ild on cellcept 2 g and plaquenil 200mg daily follow rheum Dr Jewell, c/o Painful fingers d/t Raynauds exercises daily without problem -nees ct chest-----otherwise feels stable from pulmonary point of view  4/2024 CT chest Basilar and peribronchovascular predominant groundglass opacities with traction bronchiectasis, most consistent with an NSIP pattern of interstitial lung disease, particularly in the setting of reported connective tissue disease. Overall appearance is unchanged since May 2022.  9/2024 CT chest new/enlarging peribronch nodularities in anterior ALINA  9/2024  ild on cellcept 2 g and plaquenil 200mg daily follow rheum Dr Jewell doing well from pulm perspective s/p CP while exercising -led to cardiac cath at [facility] which pt reports was negative except for myocardial bridging-started on atenolon-no further CP

## 2024-09-26 ENCOUNTER — RESULT REVIEW (OUTPATIENT)
Age: 64
End: 2024-09-26

## 2024-11-01 ENCOUNTER — TRANSCRIPTION ENCOUNTER (OUTPATIENT)
Age: 64
End: 2024-11-01

## 2024-11-18 ENCOUNTER — RX RENEWAL (OUTPATIENT)
Age: 64
End: 2024-11-18

## 2025-01-06 ENCOUNTER — APPOINTMENT (OUTPATIENT)
Dept: PULMONOLOGY | Facility: CLINIC | Age: 65
End: 2025-01-06

## 2025-01-13 ENCOUNTER — APPOINTMENT (OUTPATIENT)
Dept: CT IMAGING | Facility: CLINIC | Age: 65
End: 2025-01-13
Payer: MEDICARE

## 2025-01-13 PROCEDURE — 71250 CT THORAX DX C-: CPT

## 2025-01-27 ENCOUNTER — APPOINTMENT (OUTPATIENT)
Dept: PULMONOLOGY | Facility: CLINIC | Age: 65
End: 2025-01-27
Payer: MEDICARE

## 2025-01-27 VITALS
HEIGHT: 64 IN | DIASTOLIC BLOOD PRESSURE: 82 MMHG | WEIGHT: 124 LBS | OXYGEN SATURATION: 98 % | BODY MASS INDEX: 21.17 KG/M2 | SYSTOLIC BLOOD PRESSURE: 124 MMHG | HEART RATE: 57 BPM

## 2025-01-27 DIAGNOSIS — J84.9 INTERSTITIAL PULMONARY DISEASE, UNSPECIFIED: ICD-10-CM

## 2025-01-27 DIAGNOSIS — Q24.5 MALFORMATION OF CORONARY VESSELS: ICD-10-CM

## 2025-01-27 PROCEDURE — ZZZZZ: CPT

## 2025-01-27 PROCEDURE — 94060 EVALUATION OF WHEEZING: CPT

## 2025-01-27 PROCEDURE — 94726 PLETHYSMOGRAPHY LUNG VOLUMES: CPT

## 2025-01-27 PROCEDURE — 94729 DIFFUSING CAPACITY: CPT

## 2025-01-27 PROCEDURE — 99215 OFFICE O/P EST HI 40 MIN: CPT | Mod: 25

## 2025-01-27 PROCEDURE — 94618 PULMONARY STRESS TESTING: CPT

## 2025-01-27 RX ORDER — ATENOLOL 50 MG/1
TABLET ORAL
Refills: 0 | Status: ACTIVE | COMMUNITY

## 2025-01-27 RX ORDER — DENOSUMAB 60 MG/ML
INJECTION SUBCUTANEOUS
Refills: 0 | Status: ACTIVE | COMMUNITY

## 2025-04-07 ENCOUNTER — NON-APPOINTMENT (OUTPATIENT)
Age: 65
End: 2025-04-07

## 2025-09-08 ENCOUNTER — APPOINTMENT (OUTPATIENT)
Dept: PULMONOLOGY | Facility: CLINIC | Age: 65
End: 2025-09-08

## 2025-09-08 VITALS
DIASTOLIC BLOOD PRESSURE: 75 MMHG | HEIGHT: 64 IN | OXYGEN SATURATION: 97 % | RESPIRATION RATE: 15 BRPM | BODY MASS INDEX: 20.66 KG/M2 | WEIGHT: 121 LBS | TEMPERATURE: 98 F | HEART RATE: 76 BPM | SYSTOLIC BLOOD PRESSURE: 116 MMHG

## 2025-09-08 PROCEDURE — 90662 IIV NO PRSV INCREASED AG IM: CPT

## 2025-09-08 PROCEDURE — 99214 OFFICE O/P EST MOD 30 MIN: CPT | Mod: 25

## 2025-09-08 PROCEDURE — G0008: CPT
